# Patient Record
(demographics unavailable — no encounter records)

---

## 2024-10-24 NOTE — REASON FOR VISIT
[FreeTextEntry1] : The patient seen for follow-up of CAD.   The patient status post coronary stenting.  The duration shows good exercise capacity without exertional symptoms.  Recently the patient underwent vascular surgery at [place].  Subsequently the patient was admitted to [facility] with hypertensive urgency.  The patient has been taking amlodipine 5 mg daily.  He has remained hypertensive at home since his discharge.

## 2024-10-24 NOTE — ASSESSMENT
[FreeTextEntry1] : CAD: Asymptomatic.  Continue aspirin and Plavix.  Hypertension: The patient is been taking amlodipine 5 mg daily.  Today we increased it to 10 mg daily.  The patient will log his blood pressures at home and bring them to me.

## 2024-11-26 NOTE — ASSESSMENT
[FreeTextEntry1] : Hypertension: Today we elected to start hydrochlorothiazide 25 mg daily.  Basic metabolic panel has been arranged for 1 week's time.  The patient has has had some renal insufficiency in the past.  The patient has a home blood pressure machine and will log his blood pressures at home and bring them to me.  CAD: The risk and benefit invasive coronary angiography have been reviewed.  The patient does not wish to consider this.

## 2024-11-26 NOTE — REASON FOR VISIT
[FreeTextEntry1] : The patient seen for follow-up of CAD.  Patient status post coronary intervention.  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  He continues to have on and off chest discomfort which is not consistently related to exertion.  Is been unchanged for many years.  The patient also reports that he is persistently hypertensive at home.

## 2024-12-17 NOTE — ASSESSMENT
[FreeTextEntry1] : CAD: Patient is status post coronary stenting.  There is been no recent chest pain.  Will continue aspirin therapy.  Hypertension: Will continue amlodipine therapy.  The patient is scheduled for follow-up with regards to his blood pressure with his primary care in 2 days time.  Will not change pharmacologic therapy at this time.  The patient had symptomatic hypotension recently.  We made him to need to accept some elevated blood pressures because of the need to avoid symptomatic hypotension.

## 2024-12-17 NOTE — REASON FOR VISIT
[FreeTextEntry1] : The patient is seen for follow-up hypertension.  The patient started hydrochlorothiazide.  Unfortunately the patient felt very weak and dizzy.  His blood pressure was systolic of approximately 90 at the time he felt weak and dizzy.  The patient reported this to his primary care doctor who discontinued hydrochlorothiazide.  Today the patient is better controlled.  The patient scheduled for follow-up with his primary care on Thursday.  There is been no recent chest pain.

## 2025-06-11 NOTE — ASSESSMENT
[FreeTextEntry1] : Patient is a 74-year-old male with past medical history of coronary artery disease, PCI to RCA in 2005 who presents to the cardiology clinic to follow-up.  Patient has had a recent admission to [facility] in April 12, 2025 where he had extensive workup including a stress test and echocardiogram as per the daughter, will request records. On my exam he appears to be euvolemic, his endorsing mild chest discomfort, sounds atypical.  Does have stable angina on exertion.  At this time we will get records from [facility] and recent labs and then decide if we need to uptitrate his medications.  History of coronary artery disease Continue with aspirin 81 mg daily, Plavix 75 mg daily, atorvastatin 40 mg daily SPECT performed in April 2025 also showed infarction, no ischemia Continue with Imdur 60 mg daily, he will bring blood pressure readings at the next visit and will see how we can uptitrate his antianginal as he does have stable angina  Heart failure with preserved ejection fraction Euvolemic during my exam today, he is on Entresto 49/51 mg twice daily, on Jardiance 25 mg daily and Lasix 20 mg daily Will get records from his primary care physician's office to see if he has had recent lab test as he does have CKD  Hypertension with fluctuating blood pressures He is on spironolactone 12.5 mg daily, Entresto 4951 and Lasix 20 mg daily.  Not on amlodipine or hydralazine anymore, I have updated his medication list  I will see the patient back in clinic in 2 weeks, sooner if needed

## 2025-06-11 NOTE — PHYSICAL EXAM
[TextEntry] : Physical Exam: General: Alert and oriented, No acute distress. Eye: Normal conjunctiva. HENMT: Oral mucosa is moist. Neck: No JVD Respiratory: Lungs are clear to auscultation, Respirations are non-labored, Breath sounds are equal, Symmetrical chest wall expansion. Cardiovascular: Regular rate, Normal rhythm, S1, S2 normal. No murmur. No edema. Musculoskeletal: No chest wall tenderness. Integumentary: Warm, Dry, Pink Neurologic: No focal defects

## 2025-06-11 NOTE — CARDIOLOGY SUMMARY
[de-identified] : June 11, 2025 Normal sinus rhythm, inferior infarct, mild ST depression in the lateral leads. [de-identified] : 2024 Small size severe defect in the inferior wall which is fixed, consistent with infarct.  There is hypokinesis of the inferior wall.  SPECT 2025 Abnormal regadenoson technetium sestamibi perfusion, large proximal to distal inferior infarct. [de-identified] : 2024 Normal ejection fraction, grade 1 diastolic dysfunction.  There is hypokinesis of the basal inferolateral wall and the basal inferior wall.     Echo April 2025 Mildly reduced ejection fraction at 47%, akinetic basal inferior, severely hypokinetic mid inferior and apical inferior. Mildly dilated left atrium mild to moderate mitral regurgitation, mild tricuspid regurgitation small PFO likely present [de-identified] : Cath March 9, 2005 Right dominant circulation Normal left main, normal LAD, normal left circumflex significant increase complete total occlusion of the proximal segment of the right coronary artery, underwent angioplasty and PCI

## 2025-06-11 NOTE — HISTORY OF PRESENT ILLNESS
[FreeTextEntry1] : History obtained from the patient and the daughter over the phone.  Patient is a 74-year-old gentleman with past medical history of CAD, PCI when he was in his 40s who presents to the cardiology clinic for a follow-up visit.  Patient is a prior patient of Dr. Harrison.  He is seeing me for the first time.  He states that he does have chest pain when he does more than moderate exertion, walk upstairs or walks for long distance.  He denies any dizziness or diaphoresis.  He denies any nausea, vomiting.  He is not very active but does get around the house and does all his chores.  He has had an admission to [facility] in April 2025 where he was admitted for heart failure exacerbation, diagnosed with heart failure for the first time.  He underwent an echocardiogram and stress test during that visit.

## 2025-06-11 NOTE — CURRENT MEDS
[TextEntry] : lasix 20mg daily spironolactone 12.5 mg daily entresto 49/51 mg BID ozempic 0.5mg weekly aspirin 81mg daily atorvastatin  40mg daily well butrin 150mg once a day carvedilol 25mg BID plavix 75mg daily donezpezil 5mg jardiace 25mg daily Fe So4 once a day imdur 60mg daily metformin 1gm BID Omeprazole once a day Tamsulosin 0.4mg once a day

## 2025-06-11 NOTE — REVIEW OF SYSTEMS
[Fever] : no fever [Headache] : no headache [Weight Gain (___ Lbs)] : no recent weight gain [Chills] : no chills [Weight Loss (___ Lbs)] : no recent weight loss [Feeling Fatigued] : not feeling fatigued [Blurry Vision] : no blurred vision [Sore Throat] : no sore throat [Cough] : no cough [Rash] : no rash [Dizziness] : no dizziness

## 2025-06-11 NOTE — RESULTS/DATA
[TextEntry] : Labs performed in April 2025 Sodium 135 Potassium 4 Chloride 97 Bicarb 23 Glucose 128 BUN 40 Creatinine 1.53 Calcium 9 Magnesium 2.2 Total cholesterol 94 HDL 37 LDL 46